# Patient Record
Sex: FEMALE | Race: WHITE | Employment: OTHER | URBAN - METROPOLITAN AREA
[De-identification: names, ages, dates, MRNs, and addresses within clinical notes are randomized per-mention and may not be internally consistent; named-entity substitution may affect disease eponyms.]

---

## 2018-03-09 ENCOUNTER — HOSPITAL ENCOUNTER (OUTPATIENT)
Age: 73
Discharge: HOME | End: 2018-03-09
Attending: EMERGENCY MEDICINE
Payer: MEDICARE

## 2018-03-09 ENCOUNTER — APPOINTMENT (OUTPATIENT)
Dept: GENERAL RADIOLOGY | Age: 73
End: 2018-03-09
Attending: EMERGENCY MEDICINE
Payer: MEDICARE

## 2018-03-09 VITALS
HEART RATE: 78 BPM | SYSTOLIC BLOOD PRESSURE: 148 MMHG | TEMPERATURE: 99 F | OXYGEN SATURATION: 100 % | HEIGHT: 60 IN | RESPIRATION RATE: 17 BRPM | WEIGHT: 123 LBS | BODY MASS INDEX: 24.15 KG/M2 | DIASTOLIC BLOOD PRESSURE: 81 MMHG

## 2018-03-09 DIAGNOSIS — S80.01XA CONTUSION OF RIGHT KNEE, INITIAL ENCOUNTER: ICD-10-CM

## 2018-03-09 DIAGNOSIS — S60.011A CONTUSION OF RIGHT THUMB WITHOUT DAMAGE TO NAIL, INITIAL ENCOUNTER: Primary | ICD-10-CM

## 2018-03-09 PROCEDURE — 99203 OFFICE O/P NEW LOW 30 MIN: CPT

## 2018-03-09 PROCEDURE — 73130 X-RAY EXAM OF HAND: CPT | Performed by: EMERGENCY MEDICINE

## 2018-03-09 NOTE — ED PROVIDER NOTES
Patient Seen in: [address]    History   Patient presents with:  Fall (musculoskeletal, neurologic)    Stated Complaint: right hand bruised     HPI  Patient  is a 77-year-old female who presents to immediate care for bradley ED Triage Vitals [03/09/18 1536]  BP: 148/81  Pulse: 78  Resp: 17  Temp: 98.5 °F (36.9 °C)  Temp src: Oral  SpO2: 100 %  O2 Device: None (Room air)    Current:/81   Pulse 78   Temp 98.5 °F (36.9 °C) (Oral)   Resp 17   Ht 152.4 cm (5')   Wt 55.8 kg

## 2018-03-09 NOTE — ED INITIAL ASSESSMENT (HPI)
Patient had a mechanical trip and fall on uneven pavement. Pain and bruising to her right hand and right knee. Patient is currently taking eliquis. Patient denies hitting her head, no loc. Patient is aox4 speech clear laurent. Son at bedside.

## 2018-03-09 NOTE — ED NOTES
Splint applied as ordered, xray copy given for personal records. Patient verbalized understanding of all discharge instructions. Patient stable at time of discharge.